# Patient Record
(demographics unavailable — no encounter records)

---

## 2024-11-12 NOTE — PHYSICAL EXAM
[Non-Tender] : non-tender [General Appearance - Alert] : alert [General Appearance - In No Acute Distress] : in no acute distress [General Appearance - Well Nourished] : well nourished [General Appearance - Well Developed] : well developed [Sclera] : the sclera and conjunctiva were normal [Oropharynx] : the oropharynx was normal [Neck Appearance] : the appearance of the neck was normal [Jugular Venous Distention Increased] : there was no jugular-venous distention [] : no respiratory distress [Respiration, Rhythm And Depth] : normal respiratory rhythm and effort [Exaggerated Use Of Accessory Muscles For Inspiration] : no accessory muscle use [Auscultation Breath Sounds / Voice Sounds] : lungs were clear to auscultation bilaterally [Heart Rate And Rhythm] : heart rate was normal and rhythm regular [Heart Sounds] : normal S1 and S2 [Edema] : there was no peripheral edema [Cervical Lymph Nodes Enlarged Posterior Bilaterally] : posterior cervical [Cervical Lymph Nodes Enlarged Anterior Bilaterally] : anterior cervical [Supraclavicular Lymph Nodes Enlarged Bilaterally] : supraclavicular [Axillary Lymph Nodes Enlarged Bilaterally] : axillary [Inguinal Lymph Nodes Enlarged Bilaterally] : inguinal [No CVA Tenderness] : no ~M costovertebral angle tenderness [No Spinal Tenderness] : no spinal tenderness [Abnormal Walk] : normal gait [Skin Color & Pigmentation] : normal skin color and pigmentation [Skin Turgor] : normal skin turgor [Oriented To Time, Place, And Person] : oriented to person, place, and time [Impaired Insight] : insight and judgment were intact [Affect] : the affect was normal [Mood] : the mood was normal [Obese] : obese [Normal] : normal [Soft, Nontender] : the abdomen was soft and nontender [No Mass] : no masses were palpated [No HSM] : no hepatosplenomegaly noted [None] : no CVA tenderness [Scleral Icterus] : No Scleral Icterus [Spider Angioma] : No spider angioma(s) were observed [Abdominal  Ascites] : no ascites [Liver Palpable ___ Finger Breadths Below Costal Margin] : was not palpable below costal margin [Asterixis] : no asterixis observed [Jaundice] : No jaundice [Palmar Erythema] : no Palmar Erythema [Depression] : no depression [FreeTextEntry1] : Grossly intact [Dilated Collat. Veins] : no collateral vein dilation [Striae] : no striae [Firm] : not firm [Rigid] : not rigid [Rebound] : no rebound [Guarding] : no guarding [Lozano's] : a negative Lozano's sign

## 2024-11-12 NOTE — HISTORY OF PRESENT ILLNESS
[FreeTextEntry1] : 70 yo Female with Hx of Type 2 DM (HBA1c 7.7->6.1), HLD (was on Zetia, now on pravastatin), overweight (BMI 26->24), and glaucoma was referred initially for fatty liver.  She was seen for initial visit on 5/11/21. Her US abd showed hepatic steatosis, s/p cholecystectomy and patent hepatic and portal veins (5/2021). FibroScan also suggested hepatic steatosis (S3), without significant fibrosis (F0-F1). Labs showed borderline transaminase elevation (5/2021 AST 41, ALT 46) with normal cholestatic parameters, synthetic function and without stigmata of CLD. CLD workup showed positive Hep C ab but negative RNA, suggesting either past infection or false positive result. DALLAS was positive 1:160, but with normal Ig panel, rest of AI workup was negative and only borderline liver enzyme elevation without fibrosis.  She received COVID vaccine Moderna 1st dose 3/11 2nd dose 4/8/2021.  She denied OTC meds, toxic habits. Sister has NAFLD, brother has cirrhosis, possibly LUU cirrhosis.  She reported bloating and was seen by GI, and was prescribed pancrelipase.   Fibroscan 5/2022 showed F3 fibrosis (111 kPa), which is significant change compared to prior F0-1. She reported "burning sensation" in mouth after taking the pancrelipase. No other symptoms.  She had cervical LN biopsy that was reportedly benign, and now has another large L cervical LN, that will be biopsied again per patient.   10/21/2022 follow up. She had MR elastography 9/2022 that also showed stage 3-4 fibrosis and steatosis.  Reported feeling well, no new symptoms. Arrived with her sister.  10/13/23 follow up. She was started Ozempic 3/2023, she is currently on 0.5 mg weekly. She lost weight from 152 to 136 lb. Her DM better controlled, HbA1c 6.1% (8/8/23).  Outside labs 8/8/23: Chol 158, , LDL 63, WBC 8.05, Hb 12, , alb 4.4, Na 139, K 4.4, Cr 0.78, AST 21, ALT 18, ALP 76, bili 0.2. She reported > 1 months diarrhea (3-4x a day, loose/watery), w/o sick contacts, w/o other symptoms, started even prior to her international travel ([place]).   2/14/24 follow up. Outside labs she brought in, showed normal liver enzymes and function, and controlled DM. She has no new symptoms and no more diarrhea.  MRI abd 11/2023 showed improvement if fibrosis to stage 1-2 and noted mild steatosis.   7/10/24 follow up. Feels well. DM controlled, see outside labs scanned in. Will repeat MRE and if still F2-3 fibrosis, will re-discuss Rezdiffra. (Fibrosis stage has been improving w/ weight and DM mgmt.)  She is here for follow up. MRE showed continued improvement, with now normal liver stiffness per 9/2024 MRE. Reported feeling well.  She had EGD 11/6/24 w/ Dr. Orozco, pending biopsy reports.

## 2024-11-12 NOTE — ASSESSMENT
[FreeTextEntry1] : 72 yo Female with Hx of Type 2 DM (HBA1c 7.7->6.1), HLD (was on Zetia, now on pravastatin), overweight (BMI 26->24), and glaucoma been followed for NAFLD since 5/2021 and pos DALLAS, without fibrosis in 2021, but around F3 fibrosis on repeat Fibroscan in 5/2022 and MR elastography 9/2022.  She declined liver biopsy and rather wanted to focus on weight loss and DM mgmt. She has been on Ozempic since 3/2023 w/ weight loss and improvement of HbA1c and normalization of liver enzymes.  CLD w/u showed Hep C ab pos, but HCV RNA neg suggesting past infection or false pos result.  HIV neg, A1AT WNL, ceruloplasmin WNL, tTG IgA neg (IgA WNL), ferritin 41, transferrin sat 15%, TSH WNL  # Metabolic risk factors (DM, HLD, Hx of being overweight) # MASLD w/ F3 fibrosis? - improved to F0? # Past Hep C infection (vs. false pos). # Pos DALLAS - Reviewed MRI abd and MR elastography and will repeat in 1 year (9/2025) - CLD workup: --- Reassurance regarding hep C results. --- HBV neg, not immune, not exposed - got 2 doses Heplisav, developed immunity --- DALLAS 1:160, but anti SMA neg, anti LKM neg, IgG and IgM WNL and transaminases now normalized. Patient deferred liver biopsy previously. Positive autoantibody sometimes can be seen even in NAFLD, but if liver enzymes elevated, would re-discuss liver biopsy.  - Discussed natural Hx of fatty liver, lifestyle modifications, diet, exercise, avoidance of alcohol, avoidance of hepatotoxins, including herbal supplements - Discussed importance of DM control, c/w Ozempic per endocrinology.  - Discussed clinical trials previously, but since liver biopsy required prior, patient deferred. - Mgmt. of HLD per endo, currently on pravastatin b/o insurance issues (was switched from Zetia) - Discussed risk of progression to cirrhosis and its complications - Will get US abd and Fibroscan 4/2025, and if not c/w MRE, will repeat MRE as well. If F2 or above, will re-discuss Rezdiffra.   # 1 month diarrhea - resolved - Stool studies unremarkable - GI f/u (has GI doctor)  # Borderline iron deficiency # Bloating - To follow up w/ GI (Dr. Orozco), s/p EGD 11/6/24, pending biopsy report  # Pos DALLAS - Rheum referral was given previously, advised to schedule  # BP better controlled  # Lymphadenopathy - Mgmt. per PCP  RTC 6 months with new BW (3/2025) and US abd/ Fibroscan (4/2025)

## 2024-11-12 NOTE — REVIEW OF SYSTEMS
[Recent Weight Loss (___ Lbs)] : recent [unfilled] ~Ulb weight loss [Negative] : Heme/Lymph [Fever] : no fever [Chills] : no chills [Feeling Poorly] : not feeling poorly [Feeling Tired] : not feeling tired [Eye Pain] : no eye pain [Abdominal Pain] : no abdominal pain [Vomiting] : no vomiting [Constipation] : no constipation [Diarrhea] : no diarrhea [Heartburn] : no heartburn [Melena] : no melena [Dysuria] : no dysuria [Itching] : no itching [Depression] : no depression [FreeTextEntry3] : Has Hx of glaucoma [FreeTextEntry7] : No hematochezia. No nausea.

## 2025-05-14 NOTE — HISTORY OF PRESENT ILLNESS
[FreeTextEntry1] : 73 yo Female with Hx of Type 2 DM (HBA1c 7.7->6.1), HLD (was on Zetia, now on pravastatin), overweight (BMI 26->24), and glaucoma was referred initially for fatty liver.  She was seen for initial visit on 5/11/21. Her US abd showed hepatic steatosis, s/p cholecystectomy and patent hepatic and portal veins (5/2021). FibroScan also suggested hepatic steatosis (S3), without significant fibrosis (F0-F1). Labs showed borderline transaminase elevation (5/2021 AST 41, ALT 46) with normal cholestatic parameters, synthetic function and without stigmata of CLD. CLD workup showed positive Hep C ab but negative RNA, suggesting either past infection or false positive result. DALLAS was positive 1:160, but with normal Ig panel, rest of AI workup was negative and only borderline liver enzyme elevation without fibrosis.  She received COVID vaccine Moderna 1st dose 3/11 2nd dose 4/8/2021.  She denied OTC meds, toxic habits. Sister has NAFLD, brother has cirrhosis, possibly LUU cirrhosis.  She reported bloating and was seen by GI, and was prescribed pancrelipase.   Fibroscan 5/2022 showed F3 fibrosis (111 kPa), which is significant change compared to prior F0-1. She reported "burning sensation" in mouth after taking the pancrelipase. No other symptoms.  She had cervical LN biopsy that was reportedly benign, and now has another large L cervical LN, that will be biopsied again per patient.   10/21/2022 follow up. She had MR elastography 9/2022 that also showed stage 3-4 fibrosis and steatosis.  Reported feeling well, no new symptoms. Arrived with her sister.  10/13/23 follow up. She was started Ozempic 3/2023, she is currently on 0.5 mg weekly. She lost weight from 152 to 136 lb. Her DM better controlled, HbA1c 6.1% (8/8/23).  Outside labs 8/8/23: Chol 158, , LDL 63, WBC 8.05, Hb 12, , alb 4.4, Na 139, K 4.4, Cr 0.78, AST 21, ALT 18, ALP 76, bili 0.2. She reported > 1 months diarrhea (3-4x a day, loose/watery), w/o sick contacts, w/o other symptoms, started even prior to her international travel ([place]).   2/14/24 follow up. Outside labs she brought in, showed normal liver enzymes and function, and controlled DM. She has no new symptoms and no more diarrhea.  MRI abd 11/2023 showed improvement if fibrosis to stage 1-2 and noted mild steatosis.   7/10/24 follow up. Feels well. DM controlled, see outside labs scanned in. Will repeat MRE and if still F2-3 fibrosis, will re-discuss Rezdiffra. (Fibrosis stage has been improving w/ weight and DM mgmt.)  11/2024 follow up. MRE showed continued improvement, with now normal liver stiffness per 9/2024 MRE. Reported feeling well.  She had EGD 11/6/24 w/ Dr. Orozco, pending biopsy reports.   She is here for follow up. Currently on Ozempic 0.25 at present. Weight is stable, BMI 24.  Labs stable. US abd w/o focal lesion.

## 2025-05-14 NOTE — ASSESSMENT
[FreeTextEntry1] : 73 yo Female with Hx of Type 2 DM (HBA1c 7.7->6.1), HLD (was on Zetia, now on pravastatin), overweight (BMI 26->24), and glaucoma been followed for NAFLD since 5/2021 and pos DALLAS, without fibrosis in 2021, but around F3 fibrosis on repeat Fibroscan in 5/2022 and MR elastography 9/2022.  She declined liver biopsy and rather wanted to focus on weight loss and DM mgmt. She has been on Ozempic since 3/2023 w/ weight loss and improvement of HbA1c and normalization of liver enzymes.  CLD w/u showed Hep C ab pos, but HCV RNA neg suggesting past infection or false pos result.  HIV neg, A1AT WNL, ceruloplasmin WNL, tTG IgA neg (IgA WNL), ferritin 41, transferrin sat 15%, TSH WNL  # Metabolic risk factors (DM, HLD, Hx of being overweight) # MASLD w/ F3 fibrosis? - improved to F0? # Past Hep C infection (vs. false pos). # Pos DALLAS - Reviewed MRI abd and MR elastography  - CLD workup: --- Reassurance regarding hep C results. --- HBV neg, not immune, not exposed - got 2 doses Heplisav, developed immunity --- DALLAS 1:160, but anti SMA neg, anti LKM neg, IgG and IgM WNL and transaminases now normalized. Patient deferred liver biopsy previously. Positive autoantibody sometimes can be seen even in NAFLD, but if liver enzymes elevated, would re-discuss liver biopsy.  - Discussed natural Hx of fatty liver, lifestyle modifications, diet, exercise, avoidance of alcohol, avoidance of hepatotoxins, including herbal supplements - Discussed importance of DM control, c/w Ozempic per endocrinology.  - Discussed clinical trials previously, but since liver biopsy required prior, patient deferred. - Mgmt. of HLD per endo, currently on pravastatin b/o insurance issues (was switched from Zetia) - Discussed risk of progression to cirrhosis and its complications - Will get US abd 4/2026 and Fibroscan Fall of 2025, and if not c/w MRE, will repeat MRE as well. If F2 or above, will re-discuss Rezdiffra.   # Borderline iron deficiency # Bloating - To follow up w/ GI (Dr. Orozco), s/p EGD 11/6/24  # Pos DALLAS - Rheum referral was given previously, advised to schedule  # BP better controlled  # Lymphadenopathy - Mgmt. per PCP  RTC 6 months with new BW (10/2025) and  Fibroscan (can be scheduled for the same day as RPA in [place])